# Patient Record
Sex: FEMALE | Race: AMERICAN INDIAN OR ALASKA NATIVE
[De-identification: names, ages, dates, MRNs, and addresses within clinical notes are randomized per-mention and may not be internally consistent; named-entity substitution may affect disease eponyms.]

---

## 2022-08-23 ENCOUNTER — HOSPITAL ENCOUNTER (OUTPATIENT)
Dept: HOSPITAL 5 - OR | Age: 52
Discharge: HOME | End: 2022-08-23
Attending: SURGERY
Payer: MEDICAID

## 2022-08-23 VITALS — SYSTOLIC BLOOD PRESSURE: 123 MMHG | DIASTOLIC BLOOD PRESSURE: 93 MMHG

## 2022-08-23 DIAGNOSIS — I10: ICD-10-CM

## 2022-08-23 DIAGNOSIS — R19.09: Primary | ICD-10-CM

## 2022-08-23 DIAGNOSIS — Z98.890: ICD-10-CM

## 2022-08-23 DIAGNOSIS — Z88.8: ICD-10-CM

## 2022-08-23 DIAGNOSIS — D17.5: ICD-10-CM

## 2022-08-23 DIAGNOSIS — Z79.899: ICD-10-CM

## 2022-08-23 DIAGNOSIS — E78.00: ICD-10-CM

## 2022-08-23 PROCEDURE — 22903 EXC ABD LES SC 3 CM/>: CPT

## 2022-08-23 PROCEDURE — 88304 TISSUE EXAM BY PATHOLOGIST: CPT

## 2022-08-23 NOTE — PROCEDURE NOTE
Date of procedure: 08/23/22


Pre-op diagnosis: soft tissue mass suprapubic abdominal wall


Post-op diagnosis: same


Procedure: 





excision soft tissue mass suprapubic abdominal wall


Findings: 





Patient identified in preoperative area and operative site marked.  She was 

taken back to the minor procedure room and the abdominal hair was clipped.  The 

area was prepped and draped in the usual sterile fashion and a timeout 

performed.  Local anesthetic was infiltrated into the skin at the intended 

incision site.  An elliptical incision was made over the palpable mass using a 

15 blade and dissection carried down through the subcutaneous tissue using the 

blade.  The mass was encountered and dissected from the surrounding normal fatty

tissue using sharp and blunt dissection.  Once the entire mass was freed from 

the surrounding structures it was removed from the wound and measured at 3.5 cm.

 This was fatty and lobulated and grossly appeared as a lipoma.  The wound was 

checked for hemostasis.  The wound was irrigated.  Hemostasis was ensured.  The 

wound was then closed in a layered fashion.  The deep layer was closed with 3-0 

Vicryl interrupted sutures.  The skin was closed with 4-0 Monocryl subcuticular 

running stitch and skin glue.  Once the glue was dry a small pressure dressing 

was applied using a 2 x 2 gauze and Tegaderm.  The specimen was sent to 

pathology.





At the end of the case all sponge, instrument, sharp counts were correct x2.  

The patient tolerated the procedure very well.  She was discharged home in 

stable condition.


Anesthesia: local


Surgeon: JOLANTA VALENTINE


Estimated blood loss: minimal


Pathology: list (mass lower abdominal wall)


Specimen disposition: to lab


Condition: stable


Disposition: other (home)